# Patient Record
Sex: MALE | Race: WHITE | NOT HISPANIC OR LATINO | URBAN - METROPOLITAN AREA
[De-identification: names, ages, dates, MRNs, and addresses within clinical notes are randomized per-mention and may not be internally consistent; named-entity substitution may affect disease eponyms.]

---

## 2018-04-01 ENCOUNTER — EMERGENCY (EMERGENCY)
Facility: HOSPITAL | Age: 23
LOS: 0 days | Discharge: HOME | End: 2018-04-01
Attending: EMERGENCY MEDICINE

## 2018-04-01 VITALS
RESPIRATION RATE: 18 BRPM | SYSTOLIC BLOOD PRESSURE: 142 MMHG | OXYGEN SATURATION: 99 % | DIASTOLIC BLOOD PRESSURE: 79 MMHG | HEART RATE: 94 BPM | TEMPERATURE: 98 F

## 2018-04-01 VITALS
RESPIRATION RATE: 18 BRPM | DIASTOLIC BLOOD PRESSURE: 73 MMHG | WEIGHT: 229.94 LBS | TEMPERATURE: 97 F | HEART RATE: 99 BPM | OXYGEN SATURATION: 99 % | SYSTOLIC BLOOD PRESSURE: 127 MMHG | HEIGHT: 76 IN
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
## 2018-04-01 DIAGNOSIS — S93.401A SPRAIN OF UNSPECIFIED LIGAMENT OF RIGHT ANKLE, INITIAL ENCOUNTER: ICD-10-CM
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
## 2018-04-01 DIAGNOSIS — M25.571 PAIN IN RIGHT ANKLE AND JOINTS OF RIGHT FOOT: ICD-10-CM
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
## 2018-04-01 NOTE — ED PROVIDER NOTE - MEDICAL DECISION MAKING DETAILS
Right ankle twisted. On exam swelling lateral malleolus. Painful to bare wt. Will cructch and splint. follow up  pmd or ortho.

## 2018-04-01 NOTE — ED PROVIDER NOTE - MUSCULOSKELETAL, MLM
+ Right ankle swelling and tenderness on lateral mal no foot tenderness or knee tenderenss. gait limited by pain. good and equal DP pulse.

## 2018-04-01 NOTE — ED PROVIDER NOTE - CHPI ED SYMPTOMS NEG
no numbness/no tingling/no deformity/no fever/no stiffness/no weakness/no abrasion/no back pain/no bruising

## 2018-04-01 NOTE — ED PROCEDURE NOTE - PROCEDURE
<<-----Click on this checkbox to enter Procedure Splint application  04/01/2018  Posterior  Active  [id number]

## 2018-04-01 NOTE — ED PROCEDURE NOTE - CPROC ED POST PROC CARE GUIDE1
Instructed patient/caregiver to follow-up with primary care physician./Elevate the injured extremity as instructed./Instructed patient/caregiver regarding signs and symptoms of infection./Verbal/written post procedure instructions were given to patient/caregiver.

## 2018-04-01 NOTE — ED ADULT TRIAGE NOTE - CHIEF COMPLAINT QUOTE
Patient complaining of pain and swelling to right ankle. [place] and stepped in a pothole. Pain 10/10.

## 2018-04-01 NOTE — ED PROVIDER NOTE - ATTENDING CONTRIBUTION TO CARE
Injury to the right ankle. Difficult to bare wt. On exam swelling to the lateral malleolus and tenderness. No erythema.

## 2019-08-24 NOTE — ED ADULT TRIAGE NOTE - DIRECT TO ROOM CARE INITIATED:
01-Apr-2018 12:02 Bactrim Counseling:  I discussed with the patient the risks of sulfa antibiotics including but not limited to GI upset, allergic reaction, drug rash, diarrhea, dizziness, photosensitivity, and yeast infections.  Rarely, more serious reactions can occur including but not limited to aplastic anemia, agranulocytosis, methemoglobinemia, blood dyscrasias, liver or kidney failure, lung infiltrates or desquamative/blistering drug rashes.

## 2022-09-19 NOTE — ED ADULT NURSE NOTE - NS ED NURSE RECORD ANOTHER VITAL SIGN
Yes
pt c/o unable to urinate or have a BM x 4 days, on ABX for abcess  A&Ox3, resp wnl, unable to eat or drink x 3 days

## 2023-02-28 NOTE — ED ADULT NURSE NOTE - AS SC BRADEN MOISTURE
Starting Qvar in addition to Wixela.  Stopping incruse ellipta (ineffective).    Rodney Vega MD  
(4) rarely moist